# Patient Record
Sex: FEMALE | Race: BLACK OR AFRICAN AMERICAN | URBAN - METROPOLITAN AREA
[De-identification: names, ages, dates, MRNs, and addresses within clinical notes are randomized per-mention and may not be internally consistent; named-entity substitution may affect disease eponyms.]

---
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
## 2020-06-16 ENCOUNTER — VIRTUAL VISIT (OUTPATIENT)
Dept: INTERNAL MEDICINE CLINIC | Age: 20
End: 2020-06-16

## 2020-06-16 DIAGNOSIS — B35.3 TINEA PEDIS OF LEFT FOOT: ICD-10-CM

## 2020-06-16 DIAGNOSIS — Z00.00 ANNUAL PHYSICAL EXAM: Primary | ICD-10-CM

## 2020-06-16 RX ORDER — NYSTATIN 100000 [USP'U]/G
POWDER TOPICAL
Qty: 30 G | Refills: 1 | Status: SHIPPED | OUTPATIENT
Start: 2020-06-16 | End: 2022-09-21

## 2020-06-16 NOTE — PROGRESS NOTES
Lilia Chang is a 23 y.o. female who was seen by synchronous (real-time) audio-video technology on 6/16/2020. Consent: Lilia Chang, who was seen by synchronous (real-time) audio-video technology, and/or her healthcare decision maker, is aware that this patient-initiated, Telehealth encounter on 6/16/2020 is a billable service, with coverage as determined by her insurance carrier. She is aware that she may receive a bill and has provided verbal consent to proceed: Yes. Assessment & Plan:   Diagnoses and all orders for this visit:    1. Annual physical exam    2. Tinea pedis of left foot        I spent at least 35 minutes on this visit with this new patient. Subjective:   Lilia Chang is a 23 y.o. female who was seen for Establish Care    Used to see dr Justyna Chowdary, but it's been a few years since she went there. 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 Went to [facility]. Only concern is some irritation between 2 smallest toes on left foot. This would be her annual cpe, but due to covid 19, it is a virtual visit,and will be billed as a new pt visit. Both of her parents were seen last week as new pt visits as well. Her mom is a nurse at [employer], her dad has a kick boxing gym. Prior to Admission medications    Not on File     No Known Allergies        ROS    Objective:   Vital Signs: (As obtained by patient/caregiver at home)  There were no vitals taken for this visit.      [INSTRUCTIONS:  \"[x]\" Indicates a positive item  \"[]\" Indicates a negative item  -- DELETE ALL ITEMS NOT EXAMINED]    Constitutional: [x] Appears well-developed and well-nourished [x] No apparent distress      [] Abnormal -     Mental status: [x] Alert and awake  [x] Oriented to person/place/time [x] Able to follow commands    [] Abnormal -     Eyes:   EOM    [x]  Normal    [] Abnormal -   Sclera  [x]  Normal    [] Abnormal -          Discharge [x]  None visible   [] Abnormal -     HENT: [x] Normocephalic, atraumatic  [] Abnormal -   [x] Mouth/Throat: Mucous membranes are moist    External Ears [x] Normal  [] Abnormal -    Neck: [x] No visualized mass [] Abnormal -     Pulmonary/Chest: [x] Respiratory effort normal   [x] No visualized signs of difficulty breathing or respiratory distress        [] Abnormal -      Musculoskeletal:   [x] Normal gait with no signs of ataxia         [x] Normal range of motion of neck        [] Abnormal -     Neurological:        [x] No Facial Asymmetry (Cranial nerve 7 motor function) (limited exam due to video visit)          [x] No gaze palsy        [] Abnormal -          Skin:        [x] No significant exanthematous lesions or discoloration noted on facial skin         [] Abnormal -            Psychiatric:       [x] Normal Affect [] Abnormal -        [x] No Hallucinations    Other pertinent observable physical exam findings:-    1.  Physical exam--check cbc, cmp, flp, tsh, hiv, a1c  2. Tinea pedis--rx for nystatin powder    Info given on hpv.  rtc one year, sooner if labs abn. We discussed the expected course, resolution and complications of the diagnosis(es) in detail. Medication risks, benefits, costs, interactions, and alternatives were discussed as indicated. I advised her to contact the office if her condition worsens, changes or fails to improve as anticipated. She expressed understanding with the diagnosis(es) and plan. Kirk Argueta is a 23 y.o. female who was evaluated by a video visit encounter for concerns as above. Patient identification was verified prior to start of the visit. A caregiver was present when appropriate. Due to this being a TeleHealth encounter (During IVAGC-82 public health emergency), evaluation of the following organ systems was limited: Vitals/Constitutional/EENT/Resp/CV/GI//MS/Neuro/Skin/Heme-Lymph-Imm.   Pursuant to the emergency declaration under the [facility] 1135 waiver authority and the Coronavirus Preparedness and Response Supplemental Appropriations Act, this Virtual  Visit was conducted, with patient's (and/or legal guardian's) consent, to reduce the patient's risk of exposure to COVID-19 and provide necessary medical care. Services were provided through a video synchronous discussion virtually to substitute for in-person clinic visit. Patient and provider were located at their individual homes.       Royal Caal III, DO

## 2020-06-16 NOTE — PATIENT INSTRUCTIONS
Office Policies Phone calls/patient messages: Please allow up to 24 hours for someone in the office to contact you about your call or message. Be mindful your provider may be out of the office or your message may require further review. We encourage you to use p[employer]waterworks for your messages as this is a faster, more efficient way to communicate with our office Medication Refills: 
         
Prescription medications require 48-72 business hours to process. We encourage you to use pfwaterworks for your refills. For controlled medications: Please allow 72 business hours to process. Certain medications may require you to  a written prescription at our office. NO narcotic/controlled medications will be prescribed after 4pm Monday through Friday or on weekends Form/Paperwork Completion: 
         
Please note a $25 fee may incur for all paperwork for completed by our providers. We ask that you allow 7-10 business days. Pre-payment is due prior to picking up/faxing the completed form. You may also download your forms to [employer]works to have your doctor print off. This is an established visit conducted via telemedicine. The patient has been instructed that this meets HIPAA criteria and acknowledges and agrees to this method of visitation. Jack Hammond LPN 
83/39/41 
7:08 PM 
 
Get fasting labs done. Nothing to eat after MN, but may drink some water. Athlete's Foot: Care Instructions Your Care Instructions Athlete's foot is an itchy rash on the foot caused by an infection with a fungus. You can get it by going barefoot in wet public areas, such as swimming pools or locker rooms. Many times there is no clear reason why you get athlete's foot. You can easily treat athlete's foot by putting medicine on your feet for 1 to 6 weeks. In some cases, a doctor may prescribe pills to kill the fungus. Follow-up care is a key part of your treatment and safety. Be sure to make and go to all appointments, and call your doctor if you are having problems. It's also a good idea to know your test results and keep a list of the medicines you take. How can you care for yourself at home? · Your doctor may suggest an over-the counter lotion or spray or may prescribe a medicine. Take your medicines exactly as prescribed. Call your doctor if you think you are having a problem with your medicine. · Keep your feet clean and dry. · When you get dressed, put your socks on before your underwear. This can prevent the fungus from spreading from your feet to your groin. To prevent athlete's foot · Wear flip-flops or other shower sandals in public locker rooms and showers and by the pool. · Dry between your toes after swimming or bathing. · Wear leather shoes or sandals, which let air get to your feet. · Change your socks as needed so your feet stay as dry as possible. · Use antifungal powder on your feet. When should you call for help? Watch closely for changes in your health, and be sure to contact your doctor if: 
· You do not get better as expected. Where can you learn more? Go to http://tania-marixa.info/ Enter M498 in the search box to learn more about \"Athlete's Foot: Care Instructions. \" Current as of: October 31, 2019               Content Version: 12.5 © 2149-4603 [employer]. Care instructions adapted under license by [employer] (which disclaims liability or warranty for this information). If you have questions about a medical condition or this instruction, always ask your healthcare professional. [place] [address] any warranty or liability for your use of this information. HPV Vaccine: Care Instructions Your Care Instructions The HPV (human papillomavirus) vaccine protects against HPV.  HPV is a common sexually transmitted infection (STI). There are many types of HPV. Some types of the virus can cause genital warts in men and women. Other types can cause cervical or oral cancer and some uncommon cancers, such as anal and vaginal cancer. Experts recommend that girls and boys age 6 or 15 get the HPV vaccine, but the vaccine can be given from age 5 to 32. If you are age 32 to 39 and have not been vaccinated for HPV, ask your doctor if getting the vaccine is right for you. Children ages 5 to 15 get the vaccine in a series of two shots over 6 months. Anyone age 13 and older gets the vaccine as a three-dose series. For the vaccine to work best, all shots in the series must be given. The best time to get the vaccine is before a person becomes sexually active. This is because the vaccine works best before there is any chance of infection with HPV. When the vaccine is given at this time, it can prevent almost all infection by the types of HPV the vaccine guards against. If someone has already been infected with the virus, the vaccine does not provide protection against the virus. Having the HPV vaccine does not change a woman's need for Pap tests. Women who have had the HPV vaccine should follow the same Pap test schedule as women who have not had the vaccine. Follow-up care is a key part of your treatment and safety. Be sure to make and go to all appointments, and call your doctor if you are having problems. It's also a good idea to know your test results and keep a list of the medicines you take. How can you care for yourself at home? · Common side effects of getting the vaccine include headache, fever, and redness or swelling at the site of the shot. Take an over-the-counter pain medicine, such as acetaminophen (Tylenol) or ibuprofen (Advil, Motrin), if you have any of these side effects after the shot. Be safe with medicines. Read and follow all instructions on the label. · Put ice or a cold pack on the sore area for 10 to 20 minutes at a time. Put a thin cloth between the ice and your skin. · If you are a parent of a child who's getting the shot, talk to your child about HPV and the vaccine. It's a chance to teach your child about safer sex and STIs. Having your child get the shot doesn't mean you're giving your child permission to have sex. When should you call for help? [id number] anytime you think you may need emergency care. For example, call if: 
· You have symptoms of a severe allergic reaction. These may include: 
? Sudden raised, red areas (hives) all over your body. ? Swelling of the throat, mouth, lips, or tongue. ? Trouble breathing. ? Passing out (losing consciousness). Or you may feel very lightheaded or suddenly feel weak, confused, or restless. Call your doctor now or seek immediate medical care if: 
· You have symptoms of an allergic reaction, such as: ? A rash or hives (raised, red areas on the skin). ? Itching. ? Swelling. ? Belly pain, nausea, or vomiting. · You have a fever for more than 1 day. Watch closely for changes in your health, and be sure to contact your doctor if you have any problems. Where can you learn more? Go to http://tania-marixa.info/ Enter C525 in the search box to learn more about \"HPV Vaccine: Care Instructions. \" Current as of: December 9, 2019               Content Version: 12.5 © 8860-3980 [employer]. Care instructions adapted under license by [employer] (which disclaims liability or warranty for this information). If you have questions about a medical condition or this instruction, always ask your healthcare professional. [address] any warranty or liability for your use of this information.

## 2022-09-21 ENCOUNTER — HOSPITAL ENCOUNTER (EMERGENCY)
Age: 22
Discharge: LEFT BEFORE DISCHARGE | End: 2022-09-21

## 2022-09-21 VITALS
WEIGHT: 160 LBS | BODY MASS INDEX: 25.71 KG/M2 | SYSTOLIC BLOOD PRESSURE: 151 MMHG | OXYGEN SATURATION: 99 % | DIASTOLIC BLOOD PRESSURE: 93 MMHG | RESPIRATION RATE: 18 BRPM | HEIGHT: 66 IN | TEMPERATURE: 98.7 F | HEART RATE: 87 BPM

## 2022-09-21 PROCEDURE — 75810000275 HC EMERGENCY DEPT VISIT NO LEVEL OF CARE

## 2022-09-22 ENCOUNTER — TELEPHONE (OUTPATIENT)
Dept: INTERNAL MEDICINE CLINIC | Age: 22
End: 2022-09-22

## 2022-09-22 NOTE — TELEPHONE ENCOUNTER
----- Message from Jaycob Espino sent at 9/22/2022  2:31 PM EDT -----  Subject: Message to Provider    QUESTIONS  Information for Provider? Pt needs note for school about being in   emergency room 9/21.  ---------------------------------------------------------------------------  --------------  [address]  [phone]; OK to leave message on voicemail  ---------------------------------------------------------------------------  --------------  SCRIPT ANSWERS  Relationship to Patient?  Self

## 2023-03-08 NOTE — MR AVS SNAPSHOT
Visit Information Date & Time Provider Department Dept. Phone Encounter #  
 1/9/2017  6:00 PM [address] [facility], [address] [id number] Follow-up Instructions Return if symptoms worsen or fail to improve. Upcoming Health Maintenance Date Due Hepatitis B Peds Age 0-18 (1 of 3 - Primary Series) 2000 IPV Peds Age 0-24 (1 of 4 - All-IPV Series) 2000 Hepatitis A Peds Age 1-18 (1 of 2 - Standard Series) 7/23/2001 MMR Peds Age 1-18 (1 of 2) 7/23/2001 DTaP/Tdap/Td series (1 - Tdap) 7/23/2007 HPV AGE 9Y-26Y (1 of 3 - Female 3 Dose Series) 7/23/2011 Varicella Peds Age 1-18 (1 of 2 - 2 Dose Adolescent Series) 7/23/2013 MCV through Age 25 (1 of 1) 7/23/2016 INFLUENZA AGE 9 TO ADULT 8/1/2016 Allergies as of 1/9/2017  Review Complete On: 1/9/2017 By: [address], DO No Known Allergies Current Immunizations  Never Reviewed Name Date  
 TB Skin Test (PPD) Intradermal  Incomplete Not reviewed this visit You Were Diagnosed With   
  
 Codes Comments Acute viral syndrome    -  Primary ICD-10-CM: B34.9 ICD-9-CM: 079.99 Fever, unspecified fever cause     ICD-10-CM: R50.9 ICD-9-CM: 780.60 Vitals BP Pulse Temp Resp Height(growth percentile) 125/77 (89 %/ 83 %)* (BP 1 Location: Right arm, BP Patient Position: Sitting) 113 100.1 °F (37.8 °C) (Oral) 16 5' 4.5\" (1.638 m) (57 %, Z= 0.17) Weight(growth percentile) SpO2 BMI OB Status Smoking Status 165 lb (74.8 kg) (93 %, Z= 1.48) 98% 27.88 kg/m2 (93 %, Z= 1.49) Having regular periods Never Smoker *BP percentiles are based on NHBPEP's 4th Report Growth percentiles are based on CDC 2-20 Years data. Vitals History BMI and BSA Data Body Mass Index Body Surface Area  
 27.88 kg/m 2 1.84 m 2 Preferred Pharmacy Pharmacy Name Phone  [id number], [address] AT [facility] [phone] Your Updated Medication List  
  
   
This list is accurate as of: 1/9/17  6:32 PM.  Always use your most recent med list.  
  
  
  
  
 ibuprofen 100 mg tablet Take 100 mg by mouth every six (6) hours as needed for Pain. We Performed the Following AMB POC RAPID INFLUENZA TEST [87296 CPT(R)] Follow-up Instructions Return if symptoms worsen or fail to improve. Introducing [facility]! Dear Parent or Guardian, Thank you for requesting a InstaEDU account for your child. With InstaEDU, you can view your childs hospital or ER discharge instructions, current allergies, immunizations and much more. In order to access your childs information, we require a signed consent on file. Please see the Meddik department or call [phone] for instructions on completing a InstaEDU Proxy request.   
Additional Information If you have questions, please visit the Frequently Asked Questions section of the InstaEDU website at [email or web address] Remember, InstaEDU is NOT to be used for urgent needs. For medical emergencies, dial 911. Now available from your iPhone and Android! Please provide this summary of care documentation to your next provider. If you have any questions after today's visit, please call [phone]. Post-Care Instructions: I reviewed with the patient in detail post-care instructions. Patient is not to engage in any heavy lifting, exercise, or swimming for the next 14 days. Should the patient develop any fevers, chills, bleeding, severe pain patient will contact the office immediately.

## 2024-01-16 ENCOUNTER — OFFICE VISIT (OUTPATIENT)
Age: 24
End: 2024-01-16
Payer: COMMERCIAL

## 2024-01-16 VITALS
OXYGEN SATURATION: 100 % | HEIGHT: 66 IN | SYSTOLIC BLOOD PRESSURE: 114 MMHG | DIASTOLIC BLOOD PRESSURE: 74 MMHG | WEIGHT: 166.8 LBS | TEMPERATURE: 98.5 F | RESPIRATION RATE: 20 BRPM | BODY MASS INDEX: 26.81 KG/M2 | HEART RATE: 83 BPM

## 2024-01-16 DIAGNOSIS — B35.3 ATHLETE'S FOOT ON RIGHT: ICD-10-CM

## 2024-01-16 DIAGNOSIS — Z00.00 ANNUAL PHYSICAL EXAM: Primary | ICD-10-CM

## 2024-01-16 DIAGNOSIS — E78.2 MIXED HYPERLIPIDEMIA: ICD-10-CM

## 2024-01-16 DIAGNOSIS — R73.03 PREDIABETES: ICD-10-CM

## 2024-01-16 PROCEDURE — 99385 PREV VISIT NEW AGE 18-39: CPT | Performed by: INTERNAL MEDICINE

## 2024-01-16 RX ORDER — NYSTATIN 100000 [USP'U]/G
POWDER TOPICAL 3 TIMES DAILY
Qty: 15 G | Refills: 1 | Status: SHIPPED | OUTPATIENT
Start: 2024-01-16

## 2024-01-16 SDOH — ECONOMIC STABILITY: INCOME INSECURITY: HOW HARD IS IT FOR YOU TO PAY FOR THE VERY BASICS LIKE FOOD, HOUSING, MEDICAL CARE, AND HEATING?: NOT HARD AT ALL

## 2024-01-16 SDOH — ECONOMIC STABILITY: FOOD INSECURITY: WITHIN THE PAST 12 MONTHS, YOU WORRIED THAT YOUR FOOD WOULD RUN OUT BEFORE YOU GOT MONEY TO BUY MORE.: NEVER TRUE

## 2024-01-16 SDOH — ECONOMIC STABILITY: HOUSING INSECURITY
IN THE LAST 12 MONTHS, WAS THERE A TIME WHEN YOU DID NOT HAVE A STEADY PLACE TO SLEEP OR SLEPT IN A SHELTER (INCLUDING NOW)?: NO

## 2024-01-16 SDOH — ECONOMIC STABILITY: FOOD INSECURITY: WITHIN THE PAST 12 MONTHS, THE FOOD YOU BOUGHT JUST DIDN'T LAST AND YOU DIDN'T HAVE MONEY TO GET MORE.: NEVER TRUE

## 2024-01-16 ASSESSMENT — PATIENT HEALTH QUESTIONNAIRE - PHQ9
2. FEELING DOWN, DEPRESSED OR HOPELESS: 0
SUM OF ALL RESPONSES TO PHQ QUESTIONS 1-9: 0
SUM OF ALL RESPONSES TO PHQ9 QUESTIONS 1 & 2: 0
1. LITTLE INTEREST OR PLEASURE IN DOING THINGS: 0

## 2024-01-16 NOTE — PROGRESS NOTES
1. \"Have you been to the ER, urgent care clinic since your last visit?  Hospitalized since your last visit?\" No    2. \"Have you seen or consulted any other health care providers outside of the [facility] since your last visit?\" No     3. For patients aged 45-75: Has the patient had a colonoscopy / FIT/ Cologuard? NA - based on age      If the patient is female:    4. For patients aged 40-74: Has the patient had a mammogram within the past 2 years? NA - based on age or sex      5. For patients aged 21-65: Has the patient had a pap smear? No

## 2024-01-16 NOTE — PROGRESS NOTES
After obtaining consent, and per verbal order from Dr. Martinez , patient received influenza vaccine given by Anna Almaguer in left Deltoid. Influenza Vaccine 0.5 mL IM now. Patient was observed for 10 minutes post injection. Patient tolerated injection well.       Anna Almaguer LPN

## 2024-01-16 NOTE — PATIENT INSTRUCTIONS
Make an appt with gynecology for woman's exam.  They can hopefully help you with the genetic testing.

## 2024-01-17 LAB
ALBUMIN SERPL-MCNC: 4.4 G/DL (ref 3.5–5)
ALBUMIN/GLOB SERPL: 1.2 (ref 1.1–2.2)
ALP SERPL-CCNC: 63 U/L (ref 45–117)
ALT SERPL-CCNC: 27 U/L (ref 12–78)
ANION GAP SERPL CALC-SCNC: 8 MMOL/L (ref 5–15)
AST SERPL-CCNC: 18 U/L (ref 15–37)
BASOPHILS # BLD: 0 K/UL (ref 0–0.1)
BASOPHILS NFR BLD: 1 % (ref 0–1)
BILIRUB SERPL-MCNC: 0.7 MG/DL (ref 0.2–1)
BUN SERPL-MCNC: 8 MG/DL (ref 6–20)
BUN/CREAT SERPL: 10 (ref 12–20)
CALCIUM SERPL-MCNC: 9.4 MG/DL (ref 8.5–10.1)
CHLORIDE SERPL-SCNC: 107 MMOL/L (ref 97–108)
CHOLEST SERPL-MCNC: 124 MG/DL
CO2 SERPL-SCNC: 23 MMOL/L (ref 21–32)
CREAT SERPL-MCNC: 0.81 MG/DL (ref 0.55–1.02)
DIFFERENTIAL METHOD BLD: NORMAL
EOSINOPHIL # BLD: 0 K/UL (ref 0–0.4)
EOSINOPHIL NFR BLD: 0 % (ref 0–7)
ERYTHROCYTE [DISTWIDTH] IN BLOOD BY AUTOMATED COUNT: 12.9 % (ref 11.5–14.5)
EST. AVERAGE GLUCOSE BLD GHB EST-MCNC: 103 MG/DL
GLOBULIN SER CALC-MCNC: 3.6 G/DL (ref 2–4)
GLUCOSE SERPL-MCNC: 83 MG/DL (ref 65–100)
HBA1C MFR BLD: 5.2 % (ref 4–5.6)
HCT VFR BLD AUTO: 40.8 % (ref 35–47)
HCV AB SER IA-ACNC: 0.17 INDEX
HCV AB SERPL QL IA: NONREACTIVE
HDLC SERPL-MCNC: 52 MG/DL
HDLC SERPL: 2.4 (ref 0–5)
HGB BLD-MCNC: 13.4 G/DL (ref 11.5–16)
HIV 1+2 AB+HIV1 P24 AG SERPL QL IA: NONREACTIVE
HIV 1/2 RESULT COMMENT: NORMAL
IMM GRANULOCYTES # BLD AUTO: 0 K/UL (ref 0–0.04)
IMM GRANULOCYTES NFR BLD AUTO: 0 % (ref 0–0.5)
LDLC SERPL CALC-MCNC: 64.8 MG/DL (ref 0–100)
LYMPHOCYTES # BLD: 2.1 K/UL (ref 0.8–3.5)
LYMPHOCYTES NFR BLD: 49 % (ref 12–49)
MCH RBC QN AUTO: 28.9 PG (ref 26–34)
MCHC RBC AUTO-ENTMCNC: 32.8 G/DL (ref 30–36.5)
MCV RBC AUTO: 87.9 FL (ref 80–99)
MONOCYTES # BLD: 0.4 K/UL (ref 0–1)
MONOCYTES NFR BLD: 8 % (ref 5–13)
NEUTS SEG # BLD: 1.8 K/UL (ref 1.8–8)
NEUTS SEG NFR BLD: 42 % (ref 32–75)
NRBC # BLD: 0 K/UL (ref 0–0.01)
NRBC BLD-RTO: 0 PER 100 WBC
PLATELET # BLD AUTO: 273 K/UL (ref 150–400)
PMV BLD AUTO: 10.2 FL (ref 8.9–12.9)
POTASSIUM SERPL-SCNC: 4.3 MMOL/L (ref 3.5–5.1)
PROT SERPL-MCNC: 8 G/DL (ref 6.4–8.2)
RBC # BLD AUTO: 4.64 M/UL (ref 3.8–5.2)
SODIUM SERPL-SCNC: 138 MMOL/L (ref 136–145)
TRIGL SERPL-MCNC: 36 MG/DL
TSH SERPL DL<=0.05 MIU/L-ACNC: 1 UIU/ML (ref 0.36–3.74)
VLDLC SERPL CALC-MCNC: 7.2 MG/DL
WBC # BLD AUTO: 4.2 K/UL (ref 3.6–11)

## 2024-01-23 ENCOUNTER — OFFICE VISIT (OUTPATIENT)
Age: 24
End: 2024-01-23
Payer: COMMERCIAL

## 2024-01-23 ENCOUNTER — TELEPHONE (OUTPATIENT)
Age: 24
End: 2024-01-23

## 2024-01-23 VITALS
BODY MASS INDEX: 26.52 KG/M2 | TEMPERATURE: 97.9 F | HEIGHT: 66 IN | RESPIRATION RATE: 15 BRPM | SYSTOLIC BLOOD PRESSURE: 138 MMHG | OXYGEN SATURATION: 100 % | WEIGHT: 165 LBS | HEART RATE: 76 BPM | DIASTOLIC BLOOD PRESSURE: 82 MMHG

## 2024-01-23 DIAGNOSIS — J02.9 PHARYNGITIS, UNSPECIFIED ETIOLOGY: Primary | ICD-10-CM

## 2024-01-23 PROCEDURE — 99213 OFFICE O/P EST LOW 20 MIN: CPT | Performed by: INTERNAL MEDICINE

## 2024-01-23 RX ORDER — AMOXICILLIN AND CLAVULANATE POTASSIUM 875; 125 MG/1; MG/1
1 TABLET, FILM COATED ORAL 2 TIMES DAILY
Qty: 14 TABLET | Refills: 0 | Status: SHIPPED | OUTPATIENT
Start: 2024-01-23 | End: 2024-01-30

## 2024-01-23 ASSESSMENT — PATIENT HEALTH QUESTIONNAIRE - PHQ9
SUM OF ALL RESPONSES TO PHQ QUESTIONS 1-9: 0
1. LITTLE INTEREST OR PLEASURE IN DOING THINGS: 0
SUM OF ALL RESPONSES TO PHQ QUESTIONS 1-9: 0
2. FEELING DOWN, DEPRESSED OR HOPELESS: 0
SUM OF ALL RESPONSES TO PHQ9 QUESTIONS 1 & 2: 0

## 2024-01-23 NOTE — TELEPHONE ENCOUNTER
----- Message from Viri Tamayo sent at 1/23/2024  2:46 PM EST -----  Subject: Message to Provider    QUESTIONS  Information for Provider? Patient wanted Dr. Vanegas to know that her Covid   test was NEGATIVE. Please advise. Thank you   ---------------------------------------------------------------------------  --------------  CALL BACK INFO  [phone]; OK to leave message on voicemail  ---------------------------------------------------------------------------  --------------  SCRIPT ANSWERS  Relationship to Patient? Self

## 2024-01-23 NOTE — PROGRESS NOTES
HISTORY OF PRESENT ILLNESS  Carla Carcamo is a 23 y.o. female.  HPI    Pt of Dr Martinez. She presents for acute care.    She reports she woke up yesterday with a lump under L neck, states this is smaller today tender to palpation  She reports a cough, sinus pressure, sore throat x 3-4 days has a cough as well  Denies fever, HA, chills  No covid test     Not taking anything for this    There is no problem list on file for this patient.    Current Outpatient Medications   Medication Sig Dispense Refill    nystatin (MYCOSTATIN) [id number] UNIT/GM powder Apply topically 3 times daily 15 g 1     No current facility-administered medications for this visit.     No past surgical history on file.      Lab Results   Component Value Date    WBC 4.2 01/16/2024    HGB 13.4 01/16/2024    HCT 40.8 01/16/2024    MCV 87.9 01/16/2024     01/16/2024     Lab Results   Component Value Date    CHOL 124 01/16/2024    TRIG 36 01/16/2024    HDL 52 01/16/2024    LDLCALC 64.8 01/16/2024     Lab Results   Component Value Date    CREATININE 0.81 01/16/2024    BUN 8 01/16/2024     01/16/2024    K 4.3 01/16/2024     01/16/2024    CO2 23 01/16/2024       Review of Systems      Physical Exam  Constitutional:       General: She is not in acute distress.     Appearance: She is not ill-appearing, toxic-appearing or diaphoretic.   HENT:      Head: Normocephalic and atraumatic.   Eyes:      General:         Right eye: No discharge.         Left eye: No discharge.      Extraocular Movements: Extraocular movements intact.   Cardiovascular:      Rate and Rhythm: Normal rate and regular rhythm.      Heart sounds: No murmur heard.  Pulmonary:      Effort: Pulmonary effort is normal. No respiratory distress.      Breath sounds: Normal breath sounds. No wheezing.   Musculoskeletal:         General: No swelling or deformity.      Cervical back: Normal range of motion and neck supple. Tenderness (tender lymph node, L neck) present.

## 2024-01-23 NOTE — TELEPHONE ENCOUNTER
Called, Spoke with Pt  Received two pt identifiers  Pt informed per Dr. Vanegas will be sending in abx  Pt informed per Dr. Vanegas could be clogged salvitory duct  Pt informed per Dr. Vanegas try to find Sugar-free lemon candy  Pt informed per Dr. Vanegas if not better in 1 week let us know and will refer to ent  Pt verbalizes understanding of the instructions and has no further questions at this time.

## 2025-01-16 ENCOUNTER — OFFICE VISIT (OUTPATIENT)
Age: 25
End: 2025-01-16

## 2025-01-16 VITALS
DIASTOLIC BLOOD PRESSURE: 69 MMHG | HEIGHT: 66 IN | OXYGEN SATURATION: 99 % | RESPIRATION RATE: 14 BRPM | WEIGHT: 176.2 LBS | SYSTOLIC BLOOD PRESSURE: 124 MMHG | HEART RATE: 84 BPM | BODY MASS INDEX: 28.32 KG/M2 | TEMPERATURE: 98 F

## 2025-01-16 DIAGNOSIS — Z00.00 ANNUAL PHYSICAL EXAM: Primary | ICD-10-CM

## 2025-01-16 DIAGNOSIS — R63.5 WEIGHT GAIN: ICD-10-CM

## 2025-01-16 DIAGNOSIS — Z23 NEEDS FLU SHOT: ICD-10-CM

## 2025-01-16 SDOH — ECONOMIC STABILITY: FOOD INSECURITY: WITHIN THE PAST 12 MONTHS, THE FOOD YOU BOUGHT JUST DIDN'T LAST AND YOU DIDN'T HAVE MONEY TO GET MORE.: NEVER TRUE

## 2025-01-16 SDOH — ECONOMIC STABILITY: FOOD INSECURITY: WITHIN THE PAST 12 MONTHS, YOU WORRIED THAT YOUR FOOD WOULD RUN OUT BEFORE YOU GOT MONEY TO BUY MORE.: NEVER TRUE

## 2025-01-16 ASSESSMENT — PATIENT HEALTH QUESTIONNAIRE - PHQ9
SUM OF ALL RESPONSES TO PHQ QUESTIONS 1-9: 0
SUM OF ALL RESPONSES TO PHQ QUESTIONS 1-9: 0
2. FEELING DOWN, DEPRESSED OR HOPELESS: NOT AT ALL
SUM OF ALL RESPONSES TO PHQ QUESTIONS 1-9: 0
1. LITTLE INTEREST OR PLEASURE IN DOING THINGS: NOT AT ALL
SUM OF ALL RESPONSES TO PHQ QUESTIONS 1-9: 0
SUM OF ALL RESPONSES TO PHQ9 QUESTIONS 1 & 2: 0

## 2025-01-16 NOTE — PROGRESS NOTES
Carla Carcamo is a 24 y.o. female who presents for evaluation of annual cpe.  Last seen by me jan 16, 2024 in [facility].  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   Overall doing well, though weight is up 10 lbs, as she is not exercising like she used to.  She has no complaints.      ROS:  Constitutional: negative for fevers, chills, anorexia and weight loss  Eyes:   negative for visual disturbance and irritation  ENT:   negative for tinnitus,sore throat,nasal congestion,ear pain,hoarseness  Respiratory:  negative for cough, hemoptysis, dyspnea,wheezing  CV:   negative for chest pain, palpitations, lower extremity edema  GI:   negative for nausea, vomiting, diarrhea, abdominal pain,melena  Genitourinary: negative for frequency, dysuria and hematuria  Musculoskel: negative for myalgias, arthralgias, back pain, muscle weakness, joint pain  Neurological:  negative for headaches, dizziness, focal weakness, numbness  Psychiatric:     Negative for depression or anxiety      History reviewed. No pertinent past medical history.    No past surgical history on file.    Family History   Problem Relation Age of Onset    No Known Problems Brother     No Known Problems Mother     No Known Problems Father        Social History     Socioeconomic History    Marital status: Single     Spouse name: Not on file    Number of children: Not on file    Years of education: Not on file    Highest education level: Not on file   Occupational History    Not on file   Tobacco Use    Smoking status: Never     Passive exposure: Never    Smokeless tobacco: Never   Vaping Use    Vaping status: Never Used   Substance and Sexual Activity    Alcohol use: No    Drug use: Not Currently    Sexual activity: Not on file   Other Topics Concern    Not on file   Social History Narrative    Not on file     Social Determinants of Health     Financial Resource Strain: Low Risk  (1/16/2024)